# Patient Record
(demographics unavailable — no encounter records)

---

## 2025-01-06 NOTE — PROCEDURE
[Cervical Pap Smear] : cervical Pap smear [Liquid Base] : liquid base [IUD Removal] : intrauterine device (IUD) removal [Risks] : risks [Benefits] : benefits [Alternatives] : alternatives [Patient] : patient [Speculum Placed] : speculum placed [IUD Removed - Forceps] : IUD removed - forceps [IUD Discarded] : IUD discarded [Tolerated Well] : Patient tolerated the procedure well [No Complications] : no complications [Time out performed] : Pre-procedure time out performed.  Patient's name, date of birth and procedure confirmed. [Consent Obtained] : Consent obtained [Sent to Pathology] : specimen was placed in buffered formalin and sent for pathology [Heavy Vaginal Bleeding] : for heavy vaginal bleeding [Pelvic Pain] : for pelvic pain [de-identified] : Wants to observe "natural" menses [FreeTextEntry6] : I saw and evaluated the patient with the resident. I agree with the above assessment and plan as stated. AF

## 2025-01-06 NOTE — HISTORY OF PRESENT ILLNESS
[Patient reported PAP Smear was normal] : Patient reported PAP Smear was normal [LMP unknown] : LMP unknown [Y] : Patient is sexually active [N] : Patient denies prior pregnancies [unknown] : Patient is unsure of the date of her LMP [Menarche Age: ____] : age at menarche was [unfilled] [Currently Active] : currently active [FreeTextEntry1] : Patient G0 presents for an annual visit and IUD removal. She had Mirena IUD placed in 2018 and has had no issues. Today she is requesting removal of IUD because she wants to see what her natural menses are like. She says in a few months she may decide to have IUD replaced. She has no other concerns, declines STI testing today. She is sexually active with one male partner x 13 years. She does not have alternative plan for contraception, does not desire pregnancy. No updates to medical history in the interim; takes no medications.  OBHx: none GynHx: denies fibroids, cysts, STIs, abnl paps. Last pap 10/2023 cytology NILM. MedHx: denies SurgHx: denies SocHx: denies toxic habits x 3 FamHx: Great mat GMA - breast CA in her 60s, mother - hx melanoma; paternal and maternal side w/ cardiac disease --> pt has seen PCP recently and had lipid testing and see derm for skin checks Meds: Mirena All: NKDA [PapSmeardate] : 10/18/2023

## 2025-01-06 NOTE — PHYSICAL EXAM
[Appropriately responsive] : appropriately responsive [Alert] : alert [No Acute Distress] : no acute distress [Labia Majora] : normal [Labia Minora] : normal [Pink Rugae] : pink rugae [Discharge] : a  ~M vaginal discharge was present [Scant] : scant [White] : white [Normal] : normal [IUD String] : an IUD string was noted [FreeTextEntry5] : IUD strings visualized. Pap collected.

## 2025-01-06 NOTE — END OF VISIT
[] : A student assisted with documenting this visit. I have reviewed and verified all information documented by the student, and made modifications to such information, when appropriate. [Resident] : Resident [FreeTextEntry3] : I saw and evaluated the patient with the resident. I agree with the above assessment and plan as stated. AF [FreeTextEntry2] : I saw and evaluated the patient with the resident. I agree with the above assessment and plan as stated. AF

## 2025-01-06 NOTE — PLAN
[FreeTextEntry1] : - Pap cytology only (age<30) collected, f/u results  - IUD removed w/o complication - Advised condom use for contraception and counseled pt on immediate return of fertility, pt in understanding  -Declines STI testing  - Will f/u in clinic if desires reinsertion of IUD  - All questions answered  Jhony Mazariegos, PGY2